# Patient Record
Sex: FEMALE | Race: BLACK OR AFRICAN AMERICAN | NOT HISPANIC OR LATINO | Employment: PART TIME | URBAN - METROPOLITAN AREA
[De-identification: names, ages, dates, MRNs, and addresses within clinical notes are randomized per-mention and may not be internally consistent; named-entity substitution may affect disease eponyms.]

---

## 2023-06-02 ENCOUNTER — TELEPHONE (OUTPATIENT)
Dept: TRANSPLANT | Facility: CLINIC | Age: 53
End: 2023-06-02

## 2023-06-07 ENCOUNTER — TELEPHONE (OUTPATIENT)
Dept: TRANSPLANT | Facility: CLINIC | Age: 53
End: 2023-06-07
Payer: COMMERCIAL

## 2023-06-09 ENCOUNTER — TELEPHONE (OUTPATIENT)
Dept: TRANSPLANT | Facility: CLINIC | Age: 53
End: 2023-06-09
Payer: COMMERCIAL

## 2023-06-14 ENCOUNTER — TELEPHONE (OUTPATIENT)
Dept: TRANSPLANT | Facility: CLINIC | Age: 53
End: 2023-06-14
Payer: COMMERCIAL

## 2023-06-20 ENCOUNTER — TELEPHONE (OUTPATIENT)
Dept: TRANSPLANT | Facility: CLINIC | Age: 53
End: 2023-06-20
Payer: COMMERCIAL

## 2023-06-20 DIAGNOSIS — Z76.82 ORGAN TRANSPLANT CANDIDATE: Primary | ICD-10-CM

## 2023-08-02 ENCOUNTER — TELEPHONE (OUTPATIENT)
Dept: TRANSPLANT | Facility: CLINIC | Age: 53
End: 2023-08-02
Payer: COMMERCIAL

## 2023-08-04 ENCOUNTER — TELEPHONE (OUTPATIENT)
Dept: TRANSPLANT | Facility: CLINIC | Age: 53
End: 2023-08-04
Payer: COMMERCIAL

## 2023-08-07 ENCOUNTER — TELEPHONE (OUTPATIENT)
Dept: TRANSPLANT | Facility: CLINIC | Age: 53
End: 2023-08-07
Payer: COMMERCIAL

## 2023-08-07 NOTE — TELEPHONE ENCOUNTER
MA notes per adherence form    FOR THE PAST THREE MONTHS:    0-AMA's  0-No-shows    No concerns with care giving, transportation, or mental health    Brought over to clinic to be scanned in.    Donna Campbell  Abdominal Transplant MA

## 2023-08-09 ENCOUNTER — HOSPITAL ENCOUNTER (OUTPATIENT)
Dept: RADIOLOGY | Facility: HOSPITAL | Age: 53
Discharge: HOME | End: 2023-08-09
Attending: NURSE PRACTITIONER
Payer: MEDICARE

## 2023-08-09 ENCOUNTER — OFFICE VISIT (OUTPATIENT)
Dept: TRANSPLANT | Facility: CLINIC | Age: 53
End: 2023-08-09
Payer: MEDICARE

## 2023-08-09 ENCOUNTER — TELEPHONE (OUTPATIENT)
Dept: TRANSPLANT | Facility: CLINIC | Age: 53
End: 2023-08-09
Payer: COMMERCIAL

## 2023-08-09 ENCOUNTER — DOCUMENTATION ONLY (OUTPATIENT)
Dept: TRANSPLANT | Facility: CLINIC | Age: 53
End: 2023-08-09
Payer: COMMERCIAL

## 2023-08-09 VITALS
BODY MASS INDEX: 38.16 KG/M2 | HEIGHT: 65 IN | SYSTOLIC BLOOD PRESSURE: 120 MMHG | OXYGEN SATURATION: 98 % | HEART RATE: 82 BPM | TEMPERATURE: 97 F | RESPIRATION RATE: 18 BRPM | WEIGHT: 229.06 LBS | DIASTOLIC BLOOD PRESSURE: 69 MMHG

## 2023-08-09 DIAGNOSIS — Z76.82 ORGAN TRANSPLANT CANDIDATE: ICD-10-CM

## 2023-08-09 DIAGNOSIS — G47.33 OSA ON CPAP: ICD-10-CM

## 2023-08-09 DIAGNOSIS — M10.9 GOUT, UNSPECIFIED CAUSE, UNSPECIFIED CHRONICITY, UNSPECIFIED SITE: ICD-10-CM

## 2023-08-09 DIAGNOSIS — I10 HYPERTENSION, UNSPECIFIED TYPE: ICD-10-CM

## 2023-08-09 DIAGNOSIS — Z99.2 ESRD ON DIALYSIS: ICD-10-CM

## 2023-08-09 DIAGNOSIS — N18.6 ESRD ON DIALYSIS: ICD-10-CM

## 2023-08-09 PROCEDURE — 76770 US RETROPERITONEAL COMPLETE: ICD-10-PCS | Mod: 26,TXP,, | Performed by: RADIOLOGY

## 2023-08-09 PROCEDURE — 99205 OFFICE O/P NEW HI 60 MIN: CPT | Mod: S$GLB,TXP,, | Performed by: INTERNAL MEDICINE

## 2023-08-09 PROCEDURE — 76770 US EXAM ABDO BACK WALL COMP: CPT | Mod: TC,TXP

## 2023-08-09 PROCEDURE — 99999 PR PBB SHADOW E&M-EST. PATIENT-LVL V: CPT | Mod: PBBFAC,TXP,, | Performed by: INTERNAL MEDICINE

## 2023-08-09 PROCEDURE — 76770 US EXAM ABDO BACK WALL COMP: CPT | Mod: 26,TXP,, | Performed by: RADIOLOGY

## 2023-08-09 PROCEDURE — 72170 X-RAY EXAM OF PELVIS: CPT | Mod: 26,TXP,, | Performed by: RADIOLOGY

## 2023-08-09 PROCEDURE — 99205 PR OFFICE/OUTPT VISIT, NEW, LEVL V, 60-74 MIN: ICD-10-PCS | Mod: S$GLB,TXP,, | Performed by: TRANSPLANT SURGERY

## 2023-08-09 PROCEDURE — 99999 PR PBB SHADOW E&M-EST. PATIENT-LVL V: ICD-10-PCS | Mod: PBBFAC,TXP,, | Performed by: INTERNAL MEDICINE

## 2023-08-09 PROCEDURE — 71046 XR CHEST PA AND LATERAL: ICD-10-PCS | Mod: 26,TXP,, | Performed by: RADIOLOGY

## 2023-08-09 PROCEDURE — 99214 PR OFFICE/OUTPT VISIT, EST, LEVL IV, 30-39 MIN: ICD-10-PCS | Mod: S$GLB,TXP,, | Performed by: REGISTERED NURSE

## 2023-08-09 PROCEDURE — 99205 OFFICE O/P NEW HI 60 MIN: CPT | Mod: S$GLB,TXP,, | Performed by: TRANSPLANT SURGERY

## 2023-08-09 PROCEDURE — 71046 X-RAY EXAM CHEST 2 VIEWS: CPT | Mod: 26,TXP,, | Performed by: RADIOLOGY

## 2023-08-09 PROCEDURE — 99215 OFFICE O/P EST HI 40 MIN: CPT | Mod: PBBFAC,25,TXP | Performed by: INTERNAL MEDICINE

## 2023-08-09 PROCEDURE — 99205 PR OFFICE/OUTPT VISIT, NEW, LEVL V, 60-74 MIN: ICD-10-PCS | Mod: S$GLB,TXP,, | Performed by: INTERNAL MEDICINE

## 2023-08-09 PROCEDURE — 99214 OFFICE O/P EST MOD 30 MIN: CPT | Mod: S$GLB,TXP,, | Performed by: REGISTERED NURSE

## 2023-08-09 PROCEDURE — 71046 X-RAY EXAM CHEST 2 VIEWS: CPT | Mod: TC,TXP

## 2023-08-09 PROCEDURE — 72170 X-RAY EXAM OF PELVIS: CPT | Mod: TC,TXP

## 2023-08-09 PROCEDURE — 72170 XR PELVIS ROUTINE AP: ICD-10-PCS | Mod: 26,TXP,, | Performed by: RADIOLOGY

## 2023-08-09 RX ORDER — POTASSIUM CHLORIDE 20 MEQ/1
20 TABLET, EXTENDED RELEASE ORAL DAILY
COMMUNITY

## 2023-08-09 RX ORDER — GABAPENTIN 100 MG/1
100 CAPSULE ORAL 2 TIMES DAILY
COMMUNITY

## 2023-08-09 RX ORDER — CARVEDILOL 25 MG/1
25 TABLET ORAL 2 TIMES DAILY
COMMUNITY

## 2023-08-09 RX ORDER — ESTRADIOL 0.5 MG/1
0.5 TABLET ORAL DAILY
COMMUNITY

## 2023-08-09 RX ORDER — METOLAZONE 5 MG/1
5 TABLET ORAL DAILY
COMMUNITY

## 2023-08-09 RX ORDER — CINACALCET 30 MG/1
30 TABLET, FILM COATED ORAL
COMMUNITY

## 2023-08-09 RX ORDER — FERRIC CITRATE 210 MG/1
420 TABLET, COATED ORAL 3 TIMES DAILY
COMMUNITY

## 2023-08-09 RX ORDER — FENOFIBRATE 160 MG/1
160 TABLET ORAL DAILY
COMMUNITY

## 2023-08-09 RX ORDER — ALLOPURINOL 300 MG/1
300 TABLET ORAL DAILY
COMMUNITY

## 2023-08-09 RX ORDER — FUROSEMIDE 80 MG/1
80 TABLET ORAL 2 TIMES DAILY
COMMUNITY

## 2023-08-09 RX ORDER — COLCHICINE 0.6 MG/1
0.6 TABLET ORAL DAILY PRN
COMMUNITY

## 2023-08-09 NOTE — PROGRESS NOTES
PRE-TRANSPLANT INFECTIOUS DISEASE CONSULT    Reason for Visit:  Pre-transplant evaluation  Referring Provider: Dr. Douglas Bhatt     History of Present Illness:    53 y.o. female with a history of ESRD on PD 2/2 covid induced renal failure presents for pre-kidney transplant evaluation. Denies infectious complications.     Infectious History:  Recent hospital admissions: No  Recent infections: No  Recent or current antibiotic use: No  History of recurrent infections *(sinus / pneumonia / UTI / SBP)*: No  History of diabetic foot wound or bone/joint infection: No  Recent dental infections, issues or procedures: No  History of chicken pox: Yes  History of shingles: No  History of STI: No  History of COVID infection: Yes in 2021 which required hospitalization.     History of Immunosuppression:  Prior chemotherapy / immunosuppression: No  Prior transplant: No  History of splenectomy: No    Tuberculosis:  Prior screening for latent TB: Yes  Prior diagnosis of latent TB: No  Risk factors for TB *known exposure, incarceration, homelessness*: Yes, was employed in [employer] system.     Geographical exposures:  Currently lives in [place], [place] with Alone  Lived in the following states: [place], [place]   Lived or travelled to the [facility] [place]: No  International travel: Yes but short term trips (<1 week) - [facility]   Travel-associated illness: No    Social/Environmental:  Occupation:     Pets: No   Livestock: No  Fishing / hunting: No  Hobbies: No   Water: [place] water  Consumption of raw/undercooked meat or seafood?  Yes. Consumes both.   Tobacco: No  Alcohol: No  Recreational drug use:  No  Sexual partners: NA      Past Histories:   Past Medical History:   Diagnosis Date    Anemia     Disorder of kidney and ureter     Gout, unspecified     Hypertension     OA (osteoarthritis)     VELIA on CPAP      Past Surgical History:   Procedure Laterality Date    PERITONEAL CATHETER INSERTION       History  reviewed. No pertinent family history.  Social History     Tobacco Use    Smoking status: Never    Smokeless tobacco: Never   Substance Use Topics    Alcohol use: Not Currently    Drug use: Never     Review of patient's allergies indicates:   Allergen Reactions    Aspirin Other (See Comments)         Immunization History:  Received all childhood vaccines: Yes  All household members receive annual flu vaccine: Yes  All household members are up to date on COVID vaccine: Yes      Current antibiotics:  Antibiotics (From admission, onward)      None              Review of Systems  Review of Systems   Constitutional: Negative for chills, fever, malaise/fatigue, night sweats, weight gain and weight loss.   Respiratory:  Negative for cough and hemoptysis.    Skin:  Negative for poor wound healing, rash and suspicious lesions.          Objective  Physical Exam  Vitals reviewed.   Constitutional:       General: She is not in acute distress.     Appearance: Normal appearance. She is normal weight. She is not ill-appearing.   HENT:      Head: Normocephalic.      Nose: Nose normal.      Mouth/Throat:      Mouth: Mucous membranes are moist.      Pharynx: Oropharynx is clear.   Eyes:      General: No scleral icterus.     Conjunctiva/sclera: Conjunctivae normal.   Cardiovascular:      Rate and Rhythm: Normal rate.   Pulmonary:      Effort: Pulmonary effort is normal. No respiratory distress.   Musculoskeletal:         General: Normal range of motion.   Skin:     General: Skin is warm and dry.      Findings: No lesion or rash.   Neurological:      Mental Status: She is alert and oriented to person, place, and time.   Psychiatric:         Mood and Affect: Mood normal.         Behavior: Behavior normal.           Labs:    CBC:   Lab Results   Component Value Date    WBC 4.98 08/09/2023    HGB 10.0 (L) 08/09/2023    HCT 30.3 (L) 08/09/2023     (H) 08/09/2023     08/09/2023    GRAN 3.4 08/09/2023    GRAN 67.5 08/09/2023     "LYMPH 1.1 08/09/2023    LYMPH 22.5 08/09/2023    MONO 0.4 08/09/2023    MONO 7.4 08/09/2023    EOSINOPHIL 2.0 08/09/2023       Syphilis screening: No results found for: "RPR", "PRPQ", "FTAABS"     TB screening: No results found for: "TBGOLDPLUS", "TSPOTSCREN"    HIV screening:   Lab Results   Component Value Date    UFC15THNK Non-reactive 08/09/2023       Strongyloides IgG: No results found for: "STRONGANTIGG"    Hepatitis Serologies:   Lab Results   Component Value Date    HEPAIGG Non-reactive 08/09/2023    HEPBCAB Non-reactive 08/09/2023    HEPBSAB 40.43 08/09/2023    HEPBSAB Reactive 08/09/2023    HEPCAB Non-reactive 08/09/2023        Varicella IgG: No results found for: "VARICELLAINT"        There is no immunization history on file for this patient.       Assessment and Plan    1. Risks of Infection: Available serologies were reviewed. No unusual risks of infection or significant barriers to transplantation were identified from the infectious disease standpoint given the information available at this time.    - Acute infectious issues: None   - Pending serologies: Quantiferon gold / T-spot, RPR, Strongyloides IgG, and VZV IgG   - Please call if any pending serologic testing is positive.    2. Immunizations:  Based on the patient's immunization history and serologies, the following immunizations are recommended:  - Hepatitis A    Patient does not have immunity to hepatitis A    Vaccination ordered today: Yes   - Hepatitis B    Patient does have immunity to hepatitis B    Vaccination ordered today: No. Reason for not ordering: Immunity   - COVID    Current CDC vaccination recommendations were discussed with the patient   - Annual high dose influenza     Vaccination ordered today: Yes   - Prevnar 20    Vaccination ordered today: No. Reason for not ordering: vaccination up to date   - Tdap    Vaccination ordered today: Yes   - Shingrix    Vaccination ordered today: Yes    Recommended Pre-Transplant Immunization " Schedule   Vaccine  0m 1m 2m 6m   Pneumococcal conjugate vaccine (Prevnar 20) X      Tetanus-diphtheria-pertussis (Tdap)* X      Hepatitis A Vaccine (Havrix)** X   X   Hepatitis B Vaccine (Heplisav)** X X     Influenza (annual) X      Zoster Recombinant Vaccine (Shingrix) X  X           *Administer booster every 10 years.       **Administer if no immunity demonstrated on serologies               Patient will receive vaccines at local pharmacy. A written prescription was provided for all vaccine doses.     3. Counseling:   I discussed with the patient the risk for increased susceptibility to infections following transplantation including increased risk for infection right after transplant and if rejection should occur.  The patient has been counseled on the importance of vaccinations to decrease risk of infection and severe illness. Specific guidance has been provided to the patient regarding the patient's occupation, hobbies and activities to avoid future infectious complications.     4. Transplant Candidacy: Based on available information, there are no identified significant barriers to transplantation from an infectious disease standpoint.  Final determination of transplant candidacy will be made once evaluation is complete and reviewed by the Selection Committee.      Follow up with infectious disease as needed.       The total time for evaluation and management services performed on 08/09/2023 was greater than 30 minutes.

## 2023-08-09 NOTE — PROGRESS NOTES
Transplant Surgery  Kidney Transplant Recipient Evaluation    Referring Physician: Douglas Bhatt  Current Nephrologist: Douglas Bhatt    Subjective:     Reason for Visit: evaluate transplant candidacy    History of Present Illness: Annia Abarca is a 53 y.o. year old female undergoing transplant evaluation.    Dialysis History: April is on peritoneal dialysis.      Transplant History: N/A    Etiology of Renal Disease:  (based on medical records from referral).    External provider notes reviewed: Yes    Review of Systems   Constitutional:  Negative for activity change, appetite change, chills, fatigue and fever.   HENT:  Negative for sore throat and trouble swallowing.    Eyes:  Negative for visual disturbance.   Respiratory:  Negative for cough, chest tightness and shortness of breath.    Cardiovascular:  Negative for chest pain, palpitations and leg swelling.   Gastrointestinal:  Negative for abdominal distention, abdominal pain, blood in stool, constipation, diarrhea, nausea and vomiting.   Endocrine: Negative for polyuria.   Genitourinary:  Negative for decreased urine volume, difficulty urinating, dysuria, flank pain, frequency and hematuria.   Musculoskeletal:  Negative for gait problem, myalgias and neck stiffness.   Skin:  Negative for rash and wound.   Neurological:  Negative for dizziness, tremors, seizures, weakness, light-headedness and headaches.   Hematological:  Negative for adenopathy.   Psychiatric/Behavioral:  Negative for agitation, confusion and sleep disturbance.      Objective:     Physical Exam:  Constitutional:   Vitals reviewed: yes   Well-nourished and well-groomed: yes  Eyes:   Sclerae icteric: no   Extraocular movements intact: yes  GI:    Bowel sounds normal: yes   Tenderness: no    If yes, quadrant/location: not applicable   Palpable masses: no    If yes, quadrant/location: not applicable   Hepatosplenomegaly: no   Ascites: no   Hernia: no    If yes, type/location: not  applicable   Surgical scars: yes    If yes, type/location: Pfannenstiel  PD catheter  Resp:   Effort normal: yes   Breath sounds normal: yes    CV:   Regular rate and rhythm: yes   Heart sounds normal: yes   Femoral pulses normal: yes   Extremities edematous: no  Skin:   Rashes or lesions present: no    If yes, describe:not applicable   Jaundice:: no    Musculoskeletal:   Gait normal: yes   Strength normal: yes  Psych:   Oriented to person, place, and time: yes   Affect and mood normal: yes    Additional comments: not applicable    Diagnostics:  The following labs have been reviewed: CBC  CMP  PT  INR  PTT  Pth  abo  The following radiology images have been independently reviewed and interpreted: Renal US    Counseling: We provided Annia Abarca with a group education session today.  We discussed kidney transplantation at length with her, including risks, potential complications, and alternatives in the management of her renal failure.  The discussion included complications related to anesthesia, bleeding, infection, primary nonfunction, and ATN.  I discussed the typical postoperative course, length of hospitalization, the need for long-term immunosuppression, and the need for long-term routine follow-up.  I discussed living-donor and -donor transplantation and the relative advantages and disadvantages of each.  I also discussed average waiting times for both living donation and  donation.  I discussed national and center-specific survival rates.  I also mentioned the potential benefit of multicenter listing to candidates listed with centers within more than one organ procurement organization.  All questions were answered.    Patient advised that it is recommended that all transplant candidates, and their close contacts and household members receive Covid vaccination.    Final determination of transplant candidacy will be made once evaluation is complete and reviewed by the Kidney & Kidney/Pancreas  Selection Committee.    Coronavirus disease (COVID-19) caused by severe acute respiratory virus coronavirus 2 (SARS-C0V 2) is associated with increased mortality in solid organ transplant recipients (SOT) compared to non-transplant patients. Vaccine responses to vaccination are depressed against SARS-CoV2 compared to normal individuals but improve with third vaccination doses. Vaccination prior to SOT provides both the best opportunity for transplant candidates to develop protective immunity and to reduce the risk of serious COVID19 infections post transplantation. Organ transplant candidates at [facility] will be required to receive SARS-CoV-2 vaccination prior to being listed with a an active status, whenever possible. Exceptions will be made for disability related reasons or for sincerely held [employer] beliefs.          Transplant Surgery - Candidacy   Assessment/Plan:   Annia Abarca has end stage renal disease (ESRD) on dialysis. I see no surgical contraindication to placing a kidney transplant. Based on available information, Annia Abarca is a suitable kidney transplant candidate.     Additional testing to be completed according to the Written Order Guidelines for Adult Pre-kidney and Pancreas Transplant Evaluation (KI-02).  Interpretation of tests and discussion of patient management involves all members of the multidisciplinary transplant team.    Lindsay Srivastava MD

## 2023-08-09 NOTE — LETTER
August 10, 2023        Douglas Bhatt  [address]  [address]  [place] [address]  Phone: [phone]  Fax: [phone]             [facility] Transplant 1st Fl  [address]  [facility] [address]  Phone: [phone]   Patient: Annia Abarca   MR Number: [id number]   YOB: 1970   Date of Visit: 8/9/2023       Dear Dr. Douglas Bhatt    Thank you for referring Annia Abarca to me for evaluation. Attached you will find relevant portions of my assessment and plan of care.    If you have questions, please do not hesitate to call me. I look forward to following Annia Abarca along with you.    Sincerely,    Sarah Alvarez MD    Enclosure    If you would like to receive this communication electronically, please contact [email or web address] or [phone] to request Ellie Link access.    Ellie Link is a tool which provides read-only access to select patient information with whom you have a relationship. Its easy to use and provides real time access to review your patients record including encounter summaries, notes, results, and demographic information.    If you feel you have received this communication in error or would no longer like to receive these types of communications, please e-mail [email or web address]

## 2023-08-09 NOTE — PROGRESS NOTES
"TRANSPLANT RECIPIENT EVALUATION    Referring Physician: Douglas Bhatt  Current Nephrologist: Douglas Bhatt    SUBJECTIVE       CC:   Initial evaluation of kidney transplant candidacy.    HPI:  Ms. Abarca is a 53 y.o. year old Black or  female who has presented to be evaluated as a potential kidney transplant recipient.  She has ESRD secondary to HTN (never had kidney biopsy)  She was diagnosed with advanced kidney disease  and proteinuria for years and ended up to dialysis after developing COVID infection which required hospitalization in 2021. The oldest GFR was found in care everywhere was 15 ml/min/1.73 m2 in 2018.  Patient is currently on peritoneal dialysis started in 2021. Patient reports tolerating dialysis well. . She has a PD catheter for dialysis access. No history of peritonitis.  Current urine output is about 2 cups. She states she has been listed in [facility].     Her PMH is significant for HTN; sleep apnea;  gout - on allopurinol and colchicine as needed); COVID infection, secondary hyperparathyroidism - PTH 1330 for which she is on sensipar for last few months and anemia.  Patient denies any history of coronary artery disease, stroke, peripheral vascular disease, seizure disorder, chronic obstructive pulmonary disease, deep venous thrombosis, pulmonary embolism, recurrent urinary tract infections or malignancies.    Light-touch Clinical Frailty: she has full time job- works at school  She does basic activities on daily basis without any symptoms of chest pain, SOB.  Ambulates without an assistive device.   No - Occasionally or most of the time last week "Everything was an effort"  No - Unintentional weight loss in the prior year  Yes - walking for exercise, moderately strenuous chores, any other physical activities  60 seconds- time to cross-arm stand 15 times  History of fall: No   Home O2: No   Hypotension: No     Active wound: no  Active Cancer: " no  History of hospitalization in last 12 months: no  Previous neurogenic bladder/Self-cath/recurrent UTI: no  Anticoagulation/ antiplatelet therapy and reason: no  History of DM: no  Previous Transplant: no  Potential Donor:         Past Medical History:  Past Medical History:   Diagnosis Date    Anemia     Disorder of kidney and ureter     Had CKD for a while and developed ESRD  on HD after developing COVID infection    Gout, unspecified     Gout, unspecified     Hypertension 2018    OA (osteoarthritis)     ankles    VELIA on CPAP        Past Surgical History:  Past Surgical History:   Procedure Laterality Date    BREAST SURGERY      Breast reduction     SECTION      PD catheter      PERITONEAL CATHETER INSERTION           Family History:  Family History   Problem Relation Age of Onset    Hypertension Mother     Diabetes Mother     Kidney disease Father     Cancer Daughter         Lymphoma in        Social History:  Social History     Socioeconomic History    Marital status: Single    Number of children: 1   Tobacco Use    Smoking status: Never    Smokeless tobacco: Never   Substance and Sexual Activity    Alcohol use: Not Currently    Drug use: Never    Sexual activity: Not Currently           Current Medication  Current Outpatient Medications   Medication Sig Dispense Refill    allopurinoL (ZYLOPRIM) 300 MG tablet Take 300 mg by mouth once daily.      carvediloL (COREG) 25 MG tablet Take 25 mg by mouth 2 (two) times daily.      cinacalcet (SENSIPAR) 30 MG Tab Take 30 mg by mouth daily with breakfast.      colchicine (COLCRYS) 0.6 mg tablet Take 0.6 mg by mouth daily as needed.      estradioL (ESTRACE) 0.5 MG tablet Take 0.5 mg by mouth once daily.      fenofibrate 160 MG Tab Take 160 mg by mouth once daily.      ferric citrate (AURYXIA) 210 mg iron Tab Take 420 mg by mouth 3 (three) times daily.      furosemide (LASIX) 80 MG tablet Take 80 mg by mouth 2 (two) times daily.       gabapentin (NEURONTIN) 100 MG capsule Take 100 mg by mouth 2 (two) times daily.      metOLazone (ZAROXOLYN) 5 MG tablet Take 5 mg by mouth once daily.      potassium chloride SA (K-DUR,KLOR-CON) 20 MEQ tablet Take 20 mEq by mouth once daily.      vit B complx C/folic acid/zinc (RENAPLEX ORAL) Take by mouth.       No current facility-administered medications for this visit.           Review of Systems    Constitutional: Negative for fever, +  fatigue.   HENT: Negative for hearing loss, sore throat and mouth sores.   Respiratory: Negative for cough, chest tightness, shortness of breath and wheezing.   Cardiovascular: Negative for chest pain, palpitations and leg swelling.   Gastrointestinal: Negative for nausea, vomiting, abdominal pain, diarrhea, constipation, blood in stool and abdominal distention.   Genitourinary: UOP ~ 2-3 cups  Musculoskeletal: Negative for back pain, + arthralgias   Skin: Negative for pallor, rash and wound.   Neurological: Negative for dizziness, tremors, syncope, weakness, light-headedness and headaches.   Hematological: Negative for adenopathy. Does not bruise/bleed easily.   Psychiatric/Behavioral: Negative for confusion, sleep disturbance and dysphoric mood. The patient is not nervous/anxious.       OBJECTIVE       Body mass index is 38.3 kg/m².    Vitals:    08/09/23 0712   BP: 120/69   Pulse: 82   Resp: 18   Temp: 97.2 °F (36.2 °C)       Physical Exam    General: No acute distress, well groomed  HEENT: Normocephalic, atraumatic,  external inspection of ears and nose normal, moist mucous membranes, no oral ulcerations/lesions   Neck: Supple, symmetrical, trachea midline, no masses  Respiratory: Clear to auscultation bilaterally, respirations unlabored  Cardiovacular: Regular rate and rhythm, S1, S2 normal, no murmurs  Gastrointestinal: Soft, non-tender, bowel sounds normal, no masses, PD catheter in place  Extremities: No clubbing or cyanosis of upper extremities bilaterally, no pedal  edema bilaterally  Lymph nodes: Cervical and supraclavicular nodes normal   Neurologic: No focal neurologic deficits. alert and oriented x 3  Musculoskeletal: moves all extremities without difficulty, FROM, 5/5 strength, ambulates without an assistive device  Psychiatric: Normal mood and affect. Responds appropriately to questions.        Labs: Reviewed with the patient    ASSESSMENT     1. ESRD on dialysis        PLAN     Transplant Candidacy:   After obtaining history and performing physical exam as well as reviewing available diagnostic studies, Ms. Abarca is a suitable kidney transplant candidate.  Meets center eligibility for accepting HCV+ donor offer - Yes.  Patient educated on HCV+ donors. April is willing to accept HCV+ donor offer - Yes   Patient is a candidate for KDPI > 85 kidney donor offer - No.     Final determination of transplant candidacy will be made once workup is complete and reviewed by the selection committee.    Prior to Listing, will need the following items to be completed:  1. Standard serologies, cardiac and imaging studies   2. Weight loss encouraged  3. PTH >1000. Pt was educated about the importance of taking sensipar daily and talking to her general nephrologist for dose adjustment   4. Cardiology consult

## 2023-08-09 NOTE — PROGRESS NOTES
INITIAL PATIENT EDUCATION NOTE    Ms. Annia Abarca was seen in pre-kidney transplant clinic for evaluation for kidney, kidney/pancreas or pancreas only transplant.  The patient attended a an individual video education session that discussed/reviewed the following aspects of transplantation: evaluation including diagnostic and laboratory testing,( Chemistries, Hematology, Serologies including HIV and Hepatitis and HLA) required for transplantation and selection committee process, UNOS waitlist management/multiple listings, types of organs offered (KDPI < 85%, KDPI > 85%, PHS risk, DCD, HCV+, HIV+ for HIV+ recipients and enbloc/dual), financial aspects, surgical procedures, dietary instruction pre- and post-transplant, health maintenance pre- and post-transplant, post-transplant hospitalization and outpatient follow-up, potential to participate in a research protocol, and medication management and side effects.  A question and answer session was provided after the presentation.    The patient was seen by all members of the multi-disciplinary team to include: Nephrologist/TERESA, Surgeon, , Transplant Coordinator, , Pharmacist and Dietician (if applicable).    The patient reviewed and signed all consents for evaluation which were witnessed and sent to scanning into the [facility] chart.    The patient was given an education book and plan for further evaluation based on her individual assessment.      Reviewed program requirement for complete COVID vaccination with documentation prior to listing.  COVID education information reviewed with patient. Patient encouraged to be up to date on all vaccinations.       The patient was informed that the transplant team would manage immediate post op pain. If the patient requires long term pain management, they will need to have that pain management addressed by their PCP or previous provider who wrote for long term pain medicines.    The patient was  encouraged to call with any questions or concerns.

## 2023-08-09 NOTE — PROGRESS NOTES
PHARM.D. PRE-TRANSPLANT NOTE:    This patient's medication therapy was evaluated as part of her pre-transplant evaluation.      The following general pharmacologic concerns were noted: patient on estradiol (HRT) - recommend to hold immediately post transplant due to increased risk of thrombosis    The following concerns for post-operative pain management were noted: none    The following pharmacologic concerns related to HCV therapy were noted: none      This patient's medication profile was reviewed for considerations for DAA Hepatitis C therapy:    [x]  No current inducers of CYP 3A4 or PGP  [x]  No amiodarone on this patient's EMR profile in the last 24 months  [x]  No past or current atrial fibrillation on this patient's EMR profile       Current Outpatient Medications   Medication Sig Dispense Refill    allopurinoL (ZYLOPRIM) 300 MG tablet Take 300 mg by mouth once daily.      carvediloL (COREG) 25 MG tablet Take 25 mg by mouth 2 (two) times daily.      cinacalcet (SENSIPAR) 30 MG Tab Take 30 mg by mouth daily with breakfast.      colchicine (COLCRYS) 0.6 mg tablet Take 0.6 mg by mouth daily as needed.      estradioL (ESTRACE) 0.5 MG tablet Take 0.5 mg by mouth once daily.      fenofibrate 160 MG Tab Take 160 mg by mouth once daily.      ferric citrate (AURYXIA) 210 mg iron Tab Take 420 mg by mouth 3 (three) times daily.      furosemide (LASIX) 80 MG tablet Take 80 mg by mouth 2 (two) times daily.      gabapentin (NEURONTIN) 100 MG capsule Take 100 mg by mouth 2 (two) times daily.      metOLazone (ZAROXOLYN) 5 MG tablet Take 5 mg by mouth once daily.      potassium chloride SA (K-DUR,KLOR-CON) 20 MEQ tablet Take 20 mEq by mouth once daily.      vit B complx C/folic acid/zinc (RENAPLEX ORAL) Take by mouth.       No current facility-administered medications for this visit.           I am available for consultation and can be contacted, as needed by the other members of the Transplant team.

## 2023-08-09 NOTE — LETTER
August 9, 2023      [facility] 1st Fl  [address]  [facility] [address]  Phone: [phone]       Patient: Annia Abarca   YOB: 1970  Date of Visit: 08/09/2023    To Whom It May Concern:    Tone Abarca  was at [facility] on 08/09/2023. The patient may return to work/school on 08/10/2023 with no restrictions. If you have any questions or concerns, or if I can be of further assistance, please do not hesitate to contact me.    Sincerely,    Sade Travis LPN

## 2023-08-09 NOTE — PROGRESS NOTES
Transplant Recipient Adult Psychosocial Assessment    April Abarca  [address]  [place] [address]  Telephone Information:   Mobile [phone]   Home  [phone] (home)  Work  There is no work phone number on file.  E-mail  No e-mail address on record    Sex: female  YOB: 1970  Age: 53 y.o.    Encounter Date: 2023  [place] Citizen: yes  Primary Language: English   Needed: no    Emergency Contact:  Roberth Oneal, 28 yo daughter, [place], does drive/own car, works full time as RN at [employer] ([place]). [phone]    Family/Social Support:   Number of dependents/: pt denies  Marital history: single, not   Other family dynamics: Pt reports father is . Pt reports supportive mother Oralia Garcias is retired, living well in [place] and will assist with transplant as needed. 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 Pt reports lives alone in [place]. Pt reports supportive daughter Roberth Oneal ([place]) and supportive sister Serina Howell ([place]) will assist with transplant. Pt's highly supportive daughter Roberth with patient for evaluation and reports will be patient's primary transplant caregiver.    Household Composition:  Pt reports lives alone in [place].    Do you and your caregivers have access to reliable transportation? yes  PRIMARY CAREGIVER: Roberth Oneal, daughter, will be primary caregiver, phone number [phone]     provided in-depth information to patient and caregiver regarding pre- and post-transplant caregiver role.   strongly encourages patient and caregiver to have concrete plan regarding post-transplant care giving, including back-up caregiver(s) to ensure care giving needs are met as needed.    Patient and Caregiver states understanding all aspects of caregiver role/commitment and is  able/willing/committed to being caregiver to the fullest extent necessary.    Patient and Caregiver verbalizes understanding of the education provided today and caregiver responsibilities.         remains available. Patient and Caregiver agree to contact  in a timely manner if concerns arise.      Able to take time off work without financial concerns: yes.     Additional Significant Others who will Assist with Transplant:  Oralia Newton, 75 yo mother, [place], does drive/own car, retired. [phone]  Serina Howell, 45 yo sister, [place], does drive/own car, works full time .       Living Will: no  Healthcare Power of : no  Advance Directives on file: <<no information> per medical record.  Verbally reviewed LW/HCPA information.   provided patient with copy of LW/HCPA documents and provided education on completion of forms.    Living Donors: Education and resource information given to patient.    Highest Education Level: Attended College/Technical School completed high school and completed beauty school  Reading Ability: 12th grade  Reports difficulty with: seeing, wears glasses. Pt denies any problems learning new information.  Learns Best By:  multisensory     Status: no  VA Benefits: no     Working for Income: yes  If yes, working activity level: Working Full Time  Patient reports working full time at [employer]. 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    Spouse/Significant Other Employment: pt reports is not     Disabled: pt denies    Annual Income:  $30,000 per year  Able to afford all costs now and if transplanted, including medications: yes  Patient and Caregiver verbalizes understanding of personal responsibilities related to transplant costs and the importance of having a financial plan  to ensure that patients transplant costs are fully covered.       provided fundraising information/education. Patient and Caregiververbalizes understanding.   remains available.    Insurance:   Payer/Plan Subscr  Sex Relation Sub. Ins. ID Effective Group Num   1. BLUE CROSS BL* TARA,1970 Female Self [id number]* [id number]                                   [address] [address]   2. MEDICARE - ME* TARA,1970 Female Self [id number]                                    [address]     Primary Insurance (for UNOS reporting): Private Insurance, utilizes [facility] for medicines.  Pt reports plan to contact BCBS about any transplant benefits available such as transplant lodging, meals and travel costs.  Secondary Insurance (for UNOS reporting): Public Insurance - Medicare FFS (Fee For Service)  Patient and Caregiver verbalizes clear understanding that patient may experience difficulty obtaining and/or be denied insurance coverage post-surgery. This includes and is not limited to disability insurance, life insurance, health insurance, burial insurance, long term care insurance, and other insurances.      Patient and Caregiver also reports understanding that future health concerns related to or unrelated to transplantation may not be covered by patient's insurance.  Resources and information provided and reviewed.     Patient and Caregiver provides verbal permission to release any necessary information to outside resources for patient care and discharge planning.  Resources and information provided are reviewed.      [facility], [phone]. PD    Dialysis Adherence: Patient and Caregiver reports pt having high compliance with dialysis treatments and instructions within last 3 months.  23 Dialysis compliance update shows suitable dialysis compliance.    Infusion Service: patient utilizing? no  Home Health: patient utilizing? no  DME: yes PD equipment and  "supplies; CPAP  Pulmonary/Cardiac Rehab: pt denies   ADLS:  independent with all ADLs, including self care, medication management and does drive self/own car    Adherence:   Pt reports high medical compliance with medical appointments and instructions within last 3 months.  Adherence education and counseling provided.     Per History Section:  Past Medical History:   Diagnosis Date    Anemia     Disorder of kidney and ureter     Gout, unspecified     Hypertension     OA (osteoarthritis)     VELIA on CPAP      Social History     Tobacco Use    Smoking status: Never    Smokeless tobacco: Never   Substance Use Topics    Alcohol use: Not Currently     Social History     Substance and Sexual Activity   Drug Use Never     Social History     Substance and Sexual Activity   Sexual Activity Not Currently       Per Today's Psychosocial:  Tobacco: none, patient denies any use.  Alcohol: none at this time. Pt reports previous alcohol use was "occasional" "special occasions"  Illicit Drugs/Non-prescribed Medications: none, patient denies any use.    Patient and Caregiver states clear understanding of the potential impact of substance use as it relates to transplant candidacy and is aware of possible random substance screening.  Substance abstinence/cessation counseling, education and resources provided and reviewed.     Arrests/DWI/Treatment/Rehab: patient denies    Psychiatric History:    Mental Health: Pt denies mental health history and denies any mental health concerns. Pt denies any need for mental health referral at this time.  Psychiatrist/Counselor: pt denies  Medications:  pt denies  Suicide/Homicide Issues: pt denies any history of si/hi  Safety at home: pt reports living in safe home environment with no abuse.    Knowledge: Patient and Caregiver states having clear understanding and realistic expectations regarding the potential risks and potential benefits of organ transplantation and organ donation and agrees to " discuss with health care team members and support system members, as well as to utilize available resources and express questions and/or concerns in order to further facilitate the pt informed decision-making.  Resources and information provided and reviewed.    Patient and Caregiver is aware of [facility] affiliation and/or partnership with agencies in home health care, LTAC, SNF, [facility], and other hospitals and clinics.    Understanding: Patient and Caregiver reports having a clear understanding of the many lifetime commitments involved with being a transplant recipient, including costs, compliance, medications, lab work, procedures, appointments, concrete and financial planning, preparedness, timely and appropriate communication of concerns, abstinence (ETOH, tobacco, illicit non-prescribed drugs), adherence to all health care team recommendations, support system and caregiver involvement, appropriate and timely resource utilization and follow-through, mental health counseling as needed/recommended, and patient and caregiver responsibilities.  Social Service Handbook, resources and detailed educational information provided and reviewed.  Educational information provided.    Patient and Caregiver also reports current and expected compliance with health care regime and states having a clear understanding of the importance of compliance.      Patient and Caregiver reports a clear understanding that risks and benefits may be involved with organ transplantation and with organ donation.       Patient and Caregiver also reports clear understanding that psychosocial risk factors may affect patient, and include but are not limited to feelings of depression, generalized anxiety, anxiety regarding dependence on others, post traumatic stress disorder, feelings of guilt and other emotional and/or mental concerns, and/or exacerbation of existing mental health concerns.  Detailed resources provided and discussed.      Patient and  Caregiver agrees to access appropriate resources in a timely manner as needed and/or as recommended, and to communicate concerns appropriately.  Patient and Caregiver also reports a clear understanding of treatment options available.     Patient and Caregiver received education in a group setting.   reviewed education, provided additional information, and answered questions.    Feelings or Concerns: Pt reports high motivation to pursue kidney organ transplant at this time. Pt reports is already listed at [facility] for kidney transplant.    Coping: Identify Patient & Caregiver Strategies to [place]:   1. In the past, coping with major surgery and/or related stress - working 2 jobs; family support; mansoor and prayer; watches tv for fun, does not exercise; enjoys eating out with family/friends.   2. Currently & Pre-transplant - working 2 jobs; family support; mansoor and prayer; watches tv for fun, does not exercise; enjoys eating out with family/friends.   3. At the time of surgery -  family support; mansoor and prayer; watches tv for fun.   4. During post-Transplant & Recovery Period -  family support; mansoor and prayer; watches tv for fun.    Goals: Pt reports hope for successful kidney organ transplant so she can discontinue dialysis. Pt reports plan to return to work once transplanted and recovered. Patient referred to Vocational Rehabilitation.    Interview Behavior: Patient and Caregiver presents as alert and oriented x 4, pleasant, good eye contact, well groomed, recall good, concentration/judgement good, average intelligence, calm, communicative, cooperative, and asking and answering questions appropriately. Pt's highly supportive daughter Roberth Oneal in session with patient's permission.         Transplant Social Work - Candidacy  Assessment/Plan:     Psychosocial Suitability: Patient presents as low risk candidate for kidney transplant at this time. Based on psychosocial risk factors, patient presents as  low risk due to patient denying psychosocial barriers to kidney organ transplant at this time. Pt reports having organ transplant caregiver/transportation plan, medical insurance plan and plan to afford transplant costs all in place.    Recommendations/Additional Comments: 8-7-23 Dialysis compliance update is suitable. Pt reports plan to ask BCBS of any transplant benefits for travel, meals and lodging. 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    SW recommends that pt conduct fundraising to assist pt with pay for cost of medications, food, gas, and other transplant related needs.  SW recommends that pt remain aware of potential mental health concerns and contact the team if any concerns arise.  SW recommends that pt remain abstinent from tobacco, ETOH, and drug use.  SW supports pt's continued adherence. SW remains available to answer any questions or concerns that arise as the pt moves through the transplant process.      Final determination of transplant candidacy will be reviewed by the selection committee.      Nina ROBLES LCSW

## 2023-08-09 NOTE — TELEPHONE ENCOUNTER
Reviewed pt transplant labs.  Notified dialysis unit dietitian of the following abnormal labs via fax and requested their most recent nutrition note on this pt.  Once this note is received it will be scanned into pt's chart.     Phos 6.6

## 2023-08-16 ENCOUNTER — TELEPHONE (OUTPATIENT)
Dept: TRANSPLANT | Facility: CLINIC | Age: 53
End: 2023-08-16
Payer: COMMERCIAL

## 2023-08-16 ENCOUNTER — DOCUMENTATION ONLY (OUTPATIENT)
Dept: TRANSPLANT | Facility: CLINIC | Age: 53
End: 2023-08-16
Payer: COMMERCIAL

## 2023-08-16 DIAGNOSIS — Z76.82 ORGAN TRANSPLANT CANDIDATE: Primary | ICD-10-CM

## 2023-08-18 ENCOUNTER — TELEPHONE (OUTPATIENT)
Dept: TRANSPLANT | Facility: CLINIC | Age: 53
End: 2023-08-18
Payer: COMMERCIAL

## 2023-08-21 ENCOUNTER — TELEPHONE (OUTPATIENT)
Dept: TRANSPLANT | Facility: CLINIC | Age: 53
End: 2023-08-21
Payer: COMMERCIAL

## 2023-09-13 ENCOUNTER — TELEPHONE (OUTPATIENT)
Dept: TRANSPLANT | Facility: CLINIC | Age: 53
End: 2023-09-13
Payer: COMMERCIAL

## 2023-09-13 NOTE — TELEPHONE ENCOUNTER
"----- Message from Judd Rodriguez sent at 9/12/2023  9:58 AM CDT -----  Consult/Advisory:        Name Of Caller: Self      Contact Preference?: [phone]       What is the nature of the call?: Calling to speak w/ Dasha about her current status        Additional Notes:  "Thank you for all that you do for our patients"      "

## 2023-09-13 NOTE — PROGRESS NOTES
Returned call to pt, she reports that all work-up appointments have been scheduled, the last appt second week of October, if no further testing or appointments are needed and ready for committee will plan yo present at the  10/20/23 meeting.

## 2024-03-14 ENCOUNTER — EPISODE CHANGES (OUTPATIENT)
Dept: TRANSPLANT | Facility: CLINIC | Age: 54
End: 2024-03-14

## 2024-03-14 ENCOUNTER — TELEPHONE (OUTPATIENT)
Dept: TRANSPLANT | Facility: CLINIC | Age: 54
End: 2024-03-14
Payer: COMMERCIAL

## 2024-03-14 NOTE — TELEPHONE ENCOUNTER
Spoke w/pt regarding required test completion. Pt stated she will call me back this evening with the name of the facility where test were completed. Stated she has completed them a while ago and was under the impression her  faxed the records over to us. Pt stated she will contact her SW as well and have them send it.

## 2024-03-20 ENCOUNTER — TELEPHONE (OUTPATIENT)
Dept: TRANSPLANT | Facility: CLINIC | Age: 54
End: 2024-03-20
Payer: COMMERCIAL

## 2024-03-20 NOTE — TELEPHONE ENCOUNTER
Sp[facility] with [facility]ia from [facility] regarding completion of test. Stated she can get the mammo and colon faxed to us and the stress, echo and cards were done at [facility].

## 2024-04-01 ENCOUNTER — TELEPHONE (OUTPATIENT)
Dept: TRANSPLANT | Facility: CLINIC | Age: 54
End: 2024-04-01
Payer: COMMERCIAL

## 2024-04-01 NOTE — TELEPHONE ENCOUNTER
Attempted to contact ALYSA Ang at [facility] regarding cardiology testing. Left detailed message and call back number along with fax number to send records.

## 2024-04-01 NOTE — TELEPHONE ENCOUNTER
----- Message from Adela Singh sent at 4/1/2024  3:05 PM CDT -----  Regarding: Testing Questions      Name Of Caller:    Ashia Jarvsiood MS      Contact Preference:    [phone]      Nature of call:   Ms. Ang would like to verify if the office received the pt's Stress test, Echo and Cardiology results.

## 2024-05-23 ENCOUNTER — TELEPHONE (OUTPATIENT)
Dept: TRANSPLANT | Facility: CLINIC | Age: 54
End: 2024-05-23
Payer: COMMERCIAL

## 2024-05-23 NOTE — TELEPHONE ENCOUNTER
----- Message from Jordi Gonzalez MA sent at 5/22/2024 11:05 AM CDT -----  Regarding: FW: Patient advice  Contact: Pt  [phone]    ----- Message -----  From: Jud Park  Sent: 5/22/2024  10:16 AM CDT  To: [facility] Pre-Kidney Transplant Non-Clinical  Subject: Patient advice                                               Name of Caller: April      Contact Preference:    [phone]    Nature of Call:  Follow up call would like to verify if the office received the Stress test, Echo and Cardiology results. Please call with status

## 2024-05-23 NOTE — TELEPHONE ENCOUNTER
Spoke to pt regarding needed cardiac testing. Fax number provided to pt via email per pt request. Pt also notified PAP/GYN visit is still missing.

## 2024-05-27 ENCOUNTER — TELEPHONE (OUTPATIENT)
Dept: TRANSPLANT | Facility: CLINIC | Age: 54
End: 2024-05-27
Payer: COMMERCIAL

## 2024-05-27 NOTE — TELEPHONE ENCOUNTER
Returned call, left vm message, waiting on stress test report, gyn/pap report and colonoscopy report to complete work-up.   ----- Message from Alvina Lowery RN sent at 3/5/2024  6:52 PM CST -----  Regarding: FW: what is needed  Contact: Ashia Owen     ----- Message -----  From: Jaylin Alberts  Sent: 3/5/2024  12:08 PM CST  To: [facility] Pre-Kidney Transplant Clinical  Subject: what is needed                                   Ashia Rosa - needing to know what is still needed by patient for her evaluation  Please reach out at your convenience       Ashia Owen

## 2024-06-03 ENCOUNTER — TELEPHONE (OUTPATIENT)
Dept: TRANSPLANT | Facility: CLINIC | Age: 54
End: 2024-06-03
Payer: COMMERCIAL

## 2024-06-03 NOTE — LETTER
Lisseth 3, 2024    Annia Abarca   [address]  [place] [address]       Dear Hanny Blanco, Primary Doctor    Patient: Annia Abarca   MR Number: [id number]   YOB: 1970     A battery of tests must be done to determine if you are in suitable health to undergo a kidney transplant.  All  the recommended studies must be completed and received by the transplant team before you can be presented to the transplant selection committee. Once all your evaluation is complete the committee will then decide if you are a suitable transplant candidate.  The following studies need to be obtained at home:      _X__Gynecologic exam: The need for a pap smear will be determined by gynecologist.    _X__Cardiac stress test: ICD-10 code N19,  Z01.810  We request you to have a stress test to determine if you have any evidence of blockages in your heart.  We usually recommend a nuclear stress test or dobutamine stress echo, given most patients cannot walk on a treadmill long enough to achieve their target heart rate.     _X__Cardiology consult: ICD-10 code : Z01.801 We are asking that your cardiologist clear you for transplant surgery and maximize your medical management.  We also need to note if there are special  management strategies that need to be used during your transplant event, especially since we routinely use IV fluids to help the new kidney function at its best.  Also, your heart doctor needs to know that the average wait for a kidney transplant can be as long as 3-5 years.  Thus, we not only ask for a preoperative clearance, but also optimal management of your heart  (for example: lipids, high blood pressure, heart failure, etc.).    You and your doctor should feel free to contact us at any time, if there are questions or concerns about these tests or the transplant evaluation process.    Sincerely,    Chely Dhaliwal MD  Medical Director, Kidney & Kidney/Pancreas Transplantation      [facility] [place]  [facility] [place] [address]  [phone]

## 2024-06-03 NOTE — TELEPHONE ENCOUNTER
Spoke to Ashia at [facility] dialysis clinic, confirmed that Stress test and GYN/Pap will complete pt's work-up, sent updated order sheet to her [phone] and she confirmed that she will assist pt in getting these completed.

## 2024-06-13 ENCOUNTER — TELEPHONE (OUTPATIENT)
Dept: TRANSPLANT | Facility: CLINIC | Age: 54
End: 2024-06-13
Payer: COMMERCIAL

## 2024-06-13 NOTE — TELEPHONE ENCOUNTER
Transplant Nephrology Review of Pre-transplant work up    54 yr old AAF with ESRD 2 to presumed HTN on PD since 2021. Patient seen in [facility] On 8/2023   - followed for CKD for many years but started HD after COVID infection   - HTN   - VELIA   - gout   - secondary hyperparathyroidism with PTH of 1330     BMI 38.3    Work up pending:   - needs TTE and stress test   - needs colonoscopy   - needs pap and mammogram    Plan: send patient a 30 day letter and if no response, can close evaluation     La Nena Sherwood DO   Transplant Nephrology

## 2024-06-26 ENCOUNTER — TELEPHONE (OUTPATIENT)
Dept: TRANSPLANT | Facility: CLINIC | Age: 54
End: 2024-06-26
Payer: COMMERCIAL

## 2024-06-26 NOTE — TELEPHONE ENCOUNTER
Spoke to pt regarding stress and gyn. Pt stated she is going to [facility] in the morning to get records and andres gyn. Pt will call back tomorrow with an update.

## 2024-06-26 NOTE — TELEPHONE ENCOUNTER
----- Message from Dasha Lopez RN sent at 6/21/2024  8:21 PM CDT -----  Regarding: stress & gyn  Holly Tinsley told me to ask you to call her or her dialysis clinic tofind out if she did stress & gyn and get records if she did-   Thank you so much!!   I will be back Thursday  Dasha

## 2024-07-02 ENCOUNTER — TELEPHONE (OUTPATIENT)
Dept: TRANSPLANT | Facility: CLINIC | Age: 54
End: 2024-07-02
Payer: COMMERCIAL

## 2024-07-02 NOTE — TELEPHONE ENCOUNTER
Spoke to pt regarding stress and gyn. Pt stated she had her stress completed at [facility] with Dr. Reed Person [phone] (Nurse Soco ext. [phone]). Pt stated she had a total hysterectomy and her Dr. Johnson [phone] (Nurse Negar) at [facility] isn't sure what she needs for clearance. Records req'd.

## 2024-07-16 ENCOUNTER — TELEPHONE (OUTPATIENT)
Dept: TRANSPLANT | Facility: CLINIC | Age: 54
End: 2024-07-16
Payer: MEDICARE

## 2024-07-17 ENCOUNTER — TELEPHONE (OUTPATIENT)
Dept: TRANSPLANT | Facility: CLINIC | Age: 54
End: 2024-07-17
Payer: MEDICARE

## 2024-07-17 NOTE — TELEPHONE ENCOUNTER
MA notes per Adherence form     HH PT    FOR THE PAST THREE MONTHS:    0-AMA's  0-No-shows    No concerns with care giving, transportation, or mental health    Scanned in pt's media    Donna Campbell  Abdominal Transplant MA

## 2024-07-19 ENCOUNTER — COMMITTEE REVIEW (OUTPATIENT)
Dept: TRANSPLANT | Facility: CLINIC | Age: 54
End: 2024-07-19
Payer: MEDICARE

## 2024-07-19 NOTE — COMMITTEE REVIEW
Native Organ Dx:       SELECTION COMMITTEE NOTE    Annia Abarca was presented at selection committee on 7/19/2024 .  Patient met selection criteria for kidney transplant related to ESRD due to  .  No absolute contraindications to transplant at this time.  Patient will be placed on the cadaveric wait list pending mammogram and final financial approval from insurance company.  Patient will return to clinic for routine appointment in 1 year(s). Patient does not meet criteria for High KDPI kidney offer. Patient does not meet HCV+ donor offer. No by choice. Patient does not meet criteria for dual/enbloc Planned immunosuppression Thymo.    Notified pt via phone, she verbalized understnadin     Note written by     ===============================================    I was present at the meeting and attest to the decision of the committee.    Paul Alas  07/22/2024

## 2024-07-22 ENCOUNTER — TELEPHONE (OUTPATIENT)
Dept: TRANSPLANT | Facility: CLINIC | Age: 54
End: 2024-07-22
Payer: MEDICARE

## 2024-08-12 ENCOUNTER — EPISODE CHANGES (OUTPATIENT)
Dept: TRANSPLANT | Facility: CLINIC | Age: 54
End: 2024-08-12

## 2024-08-16 ENCOUNTER — TELEPHONE (OUTPATIENT)
Dept: TRANSPLANT | Facility: CLINIC | Age: 54
End: 2024-08-16

## 2024-10-18 ENCOUNTER — TELEPHONE (OUTPATIENT)
Dept: TRANSPLANT | Facility: CLINIC | Age: 54
End: 2024-10-18
Payer: MEDICARE

## 2024-10-18 NOTE — TELEPHONE ENCOUNTER
Chart was transferred to me and upon reviewing it I noticed the ckecklist was missing stress test. Uploaded now.  Patient was discussed with Dr. Alvarez for pending MMG results. Normal MMG report. Checklist updated and it is ok to list patient now.

## 2024-10-24 ENCOUNTER — TELEPHONE (OUTPATIENT)
Dept: TRANSPLANT | Facility: CLINIC | Age: 54
End: 2024-10-24
Payer: MEDICARE

## 2024-10-24 DIAGNOSIS — Z76.82 PRE-KIDNEY TRANSPLANT, LISTED: Primary | ICD-10-CM

## 2024-10-24 DIAGNOSIS — N18.6 END STAGE RENAL DISEASE: Primary | ICD-10-CM

## 2024-10-24 DIAGNOSIS — Z76.82 ORGAN TRANSPLANT CANDIDATE: Primary | ICD-10-CM

## 2024-10-24 DIAGNOSIS — Z76.82 ORGAN TRANSPLANT CANDIDATE: ICD-10-CM

## 2024-10-24 NOTE — LETTER
2024    Annia Abarca  [address]  [place] [address]    Dear Annia Abarca:  MRN: [id number]    Congratulations! On 10/24/2024, you were placed on  the waiting list for a  donor transplant.    Your candidacy for kidney transplant is based on the following criteria: ESRD.    Your transplant coordinator while on the waiting list is Tiesha Enriquez RN. They can be reached at [phone] or [phone] with any questions.      What to do now?    Ask your living donors to begin testing   Share our screening website with anyone interested: [email or web address]  Make sure donors have your name and date of birth  You will get transplanted much faster if you have a living donor    Have your blood sent to our Transplant Lab every month  If you are on dialysis - our Transplant Lab will work with your dialysis unit to send your blood every month  If you are not on dialysis   If you live near an [facility] lab, we will schedule you to have blood drawn every month  If you do not live near an [facility] lab, you will be sent blood kits in the mail. You will need to take a kit to your local lab or doctor to have your blood drawn every month and mail to the Transplant Lab.     Call us with ANY CHANGES  Phone numbers - we must be able to reach you anytime of the day or night when a kidney is available  Address  Insurance coverage  Dialysis unit or kidney doctor  [place]: if you have surgery, stay in the hospital, have to get blood, or have an infection    Review your Kidney/Kidney-Pancreas Transplant Guide   This will give you detailed information about what happens when  you are on the waiting list   you are called when a kidney is available    The [facility] has a transplant surgeon and physician available 365 days a year, 24 hours a day to coordinate organ acceptance, procurement, surgical placement and to address urgent patient care issues.  You will be notified in writing of any  changes to our Transplant Centers staffing plan that would impact your ability to receive a transplant.    Attached is a letter from the United Network for Organ Sharing (UNOS). It describes the services and information offered to patients by UNOS and the Organ Procurement and Transplant Network. We look forward to working with you while on the waiting list.     We would like to inform you of an important OPTN (Organ Procurement and Transplant Network) Policy change that may affect the waiting time for some candidates on our waiting list.     Waiting time is important in identifying who receives offers for kidneys. A long wait time may increase your chance of getting an offer. Wait time is based on a test called eGFR that tells how well your kidneys are working. Wait time could also be based on how long you have been on dialysis. Government and health officials have changed the way this test is used. Before this year, hospitals used an eGFR that would include your race. For Black or  Americans, this eGFR could have shown that their kidneys were working better than they were.    Because of this change, we are looking at everyones record and assessing waiting time for people who are eligible. We will be reviewing everyones medical records and will contact you if you are eligible.     Who can I talk to if I have a question?  You can contact us if you have questions or send a message through [facility].     Please give us time to answer your questions. We are working on this for many patients.    How can I learn more about eGFR and this policy change?  Go to OPTN website > Patients > Kidney > FAQ: Understanding race-neutral eGFR calculations  Full URL: https://optn.transplant.hrsa.gov/patients/by-organ/kidney/understanding-the-proposal-to-require-race-neutral-egfr-calculations/  Call the Organ Procurement and Transplantation Network (OPTN) toll-free patient services line at  2-563-706-0041    Congratulations,    Your Transplant Partner  [facility]   [facility] [place] [address]  [phone]  lh/enclosure    CC:  Douglas Bhatt MD           [facility]                                                   The Organ Procurement and Transplantation Network   Toll-free patient services line: 5-422-491-3784  Your resource for organ transplant information      Staffed 8:30 am - 5:00 pm ET Monday - Friday   Leave a message 24/7 to receive a call back    The Organ Procurement and Transplantation Network (OPTN) is the national transplant system. It makes the policies that decide how donated organs are matched to patients waiting for a transplant. The OPTN:    Makes sure donated organs get matched to people on the transplant waiting list  Tells people about the donation and transplant processes  Makes sure that the public knows about the need for more organ and tissue donations    The OPTN has a free patient services line that you can call to:  Get more information about:   o Organ donation and organ transplants   o Donation and transplant policies  Get an information kit with:   o A list of transplant hospitals   o Waiting list information  Talk about any questions you may have about your transplant hospital or organ procurement organization. The staff will do their best to help you or point you to others who may help.  Find out how you can volunteer with the OPTN and help shape transplant policy    The patient services line number is: 4-526-183-3284    Patient services line staff CANNOT answer questions about your own medical care, including:  Waiting list status  Test results  Medical records  You will need to call your transplant hospital for this information.    The following websites have more information about transplantation and donation:  OPTN: https://optn.transplant.[facility].gov/  For potential living donors and transplant recipients:   o  Living with transplant: https://www.transplantliving.org/   o Living donation process: https://optn.transplant.hrsa.gov/living-donation/     o Financial assistance: https://www.livingdonorassistance.org/  Transplantation data: https://www.srtr.org/  Organ donation: https://www.organdonor.gov/    Volunteer with the OPTN: https://optn.transplant.hrsa.gov/get-involved

## 2024-10-24 NOTE — TELEPHONE ENCOUNTER
I have attempted without success to contact this patient by phone to schedule annual wait list appts. Message was left to return call.

## 2024-10-24 NOTE — TELEPHONE ENCOUNTER
"Spoke to pt confirming scheduled annual wait list appts on 12/27/2024. Appt reminders were mailed and pt is aware to bring care giver.    ----- Message from James Davis sent at 10/24/2024  3:43 PM CDT -----    ----- Message -----  From: Judd Rodriguez  Sent: 10/24/2024   3:42 PM CDT  To: [facility] Pre-Kidney Transplant Non-Clinical    Consult/Advisory    Name Of Caller: Self    Contact Preference?: [phone]     What is the nature of the call?: Returning call to Natalie    Additional Notes:  "Thank you for all that you do for our patients"  "

## 2024-10-24 NOTE — TELEPHONE ENCOUNTER
"KIDNEY WAIT LISTING NOTE    Date of Financial clearance to list: 2024    N/[facility]:     Organ: Kidney    Last Name: Tevin  First Name: April    : 1970       Gender: female        MRN#: [id number]                                   State of Permanent Residence: [place] [address]  [place] [address]    Ethnicity: Not  or /a   Race:      Black or     CLINICAL INFORMATION   Candidate Medical Urgency Status: Active (1)  Number of Previous Kidney Transplants: 0  Number of Previous Solid Organ Transplants: 0  Did you enter number of previous kidney or other solid organ transplants? Yes  Is this Candidate a Prior Living Donor: No  (If yes, please generate letter to UNOS with patient's date of donation, recipient SSN, signed by Surgical Director after patient is listed in order to receive priority points).      ABO  ABO Blood Group:   O     ABO Confirmation: (THESE DATES MUST BE PRIOR TO THE LIST DATE AND SUPPORTED BY SEPARATE LAB REPORTS)    Internal Results    Lab Results   Component Value Date    GROUPTRH O POS 2023     Lab Results   Component Value Date    ABO O 2023    ABO O 2023       External Results    ABO Date 1:    ABO Date 2  Are either of these ABO results based on External Labs? No  (If Yes, STOP and go to source document in Media Tab for verification).    VITALS  Height:  5' 4.84" (1.647 m   Weight:  103.9 kg (229 lb 0.9 oz)    (Use height from Transplant clinic visits only).  Did you enter height/weight? Yes    HLA    Class I:  Lab Results   Component Value Date    ZWDC6HO 3 2023    TGPK8JL 29 2023    DYEP0QY 7 2023    VBCX2PX 58 2023    KBZAC3UF 6 2023    VNGTM3RS 4 2023    WKZOD7JS 6 2023    QFWKJ5AS 7 2023       Class II:  Lab Results   Component Value Date    LSGTFI07VI 12 2023    TXAGTO70LT 15 2023    DJTBXP602FR 52 2023    BCADEE7061 51 2023    BTTOK2RT 5 2023    " "HFVLH3IS 6 08/09/2023       Tested for HLA Antibodies: Yes, antibodies detected     If result is "Positive" antibodies are detected     If result is "Negative or questionable" no antibodies detected    No results found for: "CIPRAS", "CIIPRAS"    DIALYSIS INFORMATION  Is patient Pre-Dialysis: No     GFR Information  Report GFR being used as the criteria for placement on the kidney list. If not, leave blank  GFR < or = 20 ml/min? n/a  If Yes, Specify value  ___   ml/min     Initial date GFR became 20 or less:   Is GFR obtained from an Outside lab Result? n/a  (If YES verify with source document scanned into media)    If patient on Dialysis:    Is candidate currently on dialysis for ESRD? Yes  If Yes,  Date Chronic Dialysis Started:   4/8/2021  (verify with source document in Media Tab)   Dialysis Unit Name: [facility]  [address]  [address] [place] [address] [place] [id number]                        Physician Name:  Dr. Chely Santana  NPI#: [id number]    DIABETES INFORMATION  Primary Native Kidney Diagnosis:   C-Peptide Value - No results found for: "CPEPTIDE"  Current Diabetes Status: Does not have diabetes    FOR NON-KIDNEY DEPARTMENT USE ONLY:  Additional Organs Registered? none    Maximum Acceptable Number of HLA Mismatches  ABDR:     6      (0-6)               AB:               (0-4)  ADR:   _____  (0-4)              BDR: _____ (0-4)  A:        _____  (0-2)              B:      _____ (0-2)          DR: ______ (0-2)    Will Recipient Accept?   Accept HBcAB Positive Organ:            Yes  Accept HBV LIBIA Positive Organ:        No  Accept HCV Antibody Positive Organ: No   Accept HCV LIBIA Positive Organ: No    Dual Kidney and En Bloc Opt In : No  Dual  Local:   No  Dual Import:   No  En Bloc Local:   No  En Bloc Import: No     Accept KDPI > 85: Single: No     Local: No     Import: No  Accept KDPI > 85: Dual: No     Local: No     Import: No    ### NURSE TO VERIFY CONSENT AND MAKE ANY NECESSARY CHANGES NEEDED IN " UNET AT THE TIME OF VERIFICATION ###    Unacceptible Antigens  If yes, list     Lab Results   Component Value Date    AC1QBWJ NEGATIVE 08/09/2023    CIIAB DR53 08/09/2023       ### DO NOT LIST IF ANTIGEN VALUE WEAK ###    eGFR Wait Time Modification    Based on Race Black or  does patient qualify for lab review? Yes      If yes, were qualifying SPAN 20 labs found? No      If found, generate eGFR Wait Time Modification Form and scan into Media.   Send patient letter when wait time is granted.     Hep B Vaccine series completed: yes    Blood Type x2 was verified by myself and Zackery Mathis.  Blood type determination and reporting was completed according to the programs protocols and OPTN requirements.

## 2024-12-27 ENCOUNTER — HOSPITAL ENCOUNTER (OUTPATIENT)
Dept: RADIOLOGY | Facility: HOSPITAL | Age: 54
Discharge: HOME | End: 2024-12-27
Attending: NURSE PRACTITIONER
Payer: MEDICARE

## 2024-12-27 ENCOUNTER — TELEPHONE (OUTPATIENT)
Dept: TRANSPLANT | Facility: CLINIC | Age: 54
End: 2024-12-27
Payer: MEDICARE

## 2024-12-27 ENCOUNTER — OFFICE VISIT (OUTPATIENT)
Dept: TRANSPLANT | Facility: CLINIC | Age: 54
End: 2024-12-27
Payer: MEDICARE

## 2024-12-27 ENCOUNTER — HOSPITAL ENCOUNTER (OUTPATIENT)
Dept: CARDIOLOGY | Facility: HOSPITAL | Age: 54
Discharge: HOME | End: 2024-12-27
Attending: NURSE PRACTITIONER
Payer: MEDICARE

## 2024-12-27 VITALS
WEIGHT: 229.75 LBS | SYSTOLIC BLOOD PRESSURE: 136 MMHG | HEIGHT: 65 IN | BODY MASS INDEX: 38.28 KG/M2 | RESPIRATION RATE: 18 BRPM | TEMPERATURE: 98 F | HEART RATE: 74 BPM | OXYGEN SATURATION: 100 % | DIASTOLIC BLOOD PRESSURE: 75 MMHG

## 2024-12-27 VITALS
DIASTOLIC BLOOD PRESSURE: 70 MMHG | HEIGHT: 65 IN | SYSTOLIC BLOOD PRESSURE: 120 MMHG | WEIGHT: 229 LBS | BODY MASS INDEX: 38.15 KG/M2 | HEART RATE: 65 BPM

## 2024-12-27 DIAGNOSIS — Z76.82 ORGAN TRANSPLANT CANDIDATE: ICD-10-CM

## 2024-12-27 DIAGNOSIS — Z76.82 PATIENT ON WAITING LIST FOR KIDNEY TRANSPLANT: Primary | ICD-10-CM

## 2024-12-27 DIAGNOSIS — N18.6 ESRD ON DIALYSIS: ICD-10-CM

## 2024-12-27 DIAGNOSIS — I10 HYPERTENSION, UNSPECIFIED TYPE: ICD-10-CM

## 2024-12-27 DIAGNOSIS — Z99.2 ESRD ON DIALYSIS: ICD-10-CM

## 2024-12-27 LAB
ASCENDING AORTA: 3.16 CM
AV AREA BY CONTINUOUS VTI: 2.5 CM2
AV INDEX (PROSTH): 0.86
AV LVOT MEAN GRADIENT: 2 MMHG
AV LVOT PEAK GRADIENT: 4 MMHG
AV MEAN GRADIENT: 3.5 MMHG
AV PEAK GRADIENT: 6.8 MMHG
AV VALVE AREA BY VELOCITY RATIO: 2.2 CM²
AV VALVE AREA: 2.4 CM2
AV VELOCITY RATIO: 0.77
BSA FOR ECHO PROCEDURE: 2.18 M2
CV ECHO LV RWT: 0.38 CM
DOP CALC AO PEAK VEL: 1.3 M/S
DOP CALC AO VTI: 31.4 CM
DOP CALC LVOT AREA: 2.8 CM2
DOP CALC LVOT DIAMETER: 1.9 CM
DOP CALC LVOT PEAK VEL: 1 M/S
DOP CALC LVOT STROKE VOLUME: 76.5 CM3
DOP CALCLVOT PEAK VEL VTI: 27 CM
E WAVE DECELERATION TIME: 187.74 MS
E/A RATIO: 1.06
E/E' RATIO: 13.67 M/S
ECHO EF ESTIMATED: 55 %
ECHO LV POSTERIOR WALL: 1 CM (ref 0.6–1.1)
EJECTION FRACTION: 55 %
FRACTIONAL SHORTENING: 28.8 % (ref 28–44)
INTERVENTRICULAR SEPTUM: 0.8 CM (ref 0.6–1.1)
LA MAJOR: 4.83 CM
LA MINOR: 4.46 CM
LA WIDTH: 3.46 CM
LEFT ATRIUM SIZE: 3.18 CM
LEFT ATRIUM VOLUME INDEX MOD: 16 ML/M2
LEFT ATRIUM VOLUME INDEX: 20.7 ML/M2
LEFT ATRIUM VOLUME MOD: 33.51 ML
LEFT ATRIUM VOLUME: 43.37 CM3
LEFT INTERNAL DIMENSION IN SYSTOLE: 3.7 CM (ref 2.1–4)
LEFT VENTRICLE DIASTOLIC VOLUME INDEX: 62.53 ML/M2
LEFT VENTRICLE DIASTOLIC VOLUME: 131.32 ML
LEFT VENTRICLE MASS INDEX: 80.5 G/M2
LEFT VENTRICLE SYSTOLIC VOLUME INDEX: 28.2 ML/M2
LEFT VENTRICLE SYSTOLIC VOLUME: 59.27 ML
LEFT VENTRICULAR INTERNAL DIMENSION IN DIASTOLE: 5.2 CM (ref 3.5–6)
LEFT VENTRICULAR MASS: 169 G
LV LATERAL E/E' RATIO: 11.71
LV SEPTAL E/E' RATIO: 16.4
MV PEAK A VEL: 0.77 M/S
MV PEAK E VEL: 0.82 M/S
OHS CV RV/LV RATIO: 0.58 CM
RA MAJOR: 4.15 CM
RA PRESSURE ESTIMATED: 3 MMHG
RA WIDTH: 2.92 CM
RIGHT ATRIAL AREA: 10.7 CM2
RIGHT VENTRICLE DIASTOLIC BASEL DIMENSION: 3 CM
RV TISSUE DOPPLER FREE WALL SYSTOLIC VELOCITY 1 (APICAL 4 CHAMBER VIEW): 10.61 CM/S
SINUS: 3.39 CM
STJ: 3.16 CM
TDI LATERAL: 0.07 M/S
TDI SEPTAL: 0.05 M/S
TDI: 0.06 M/S
TRICUSPID ANNULAR PLANE SYSTOLIC EXCURSION: 1.78 CM
Z-SCORE OF LEFT VENTRICULAR DIMENSION IN END DIASTOLE: -2.28
Z-SCORE OF LEFT VENTRICULAR DIMENSION IN END SYSTOLE: -0.59

## 2024-12-27 PROCEDURE — 93306 TTE W/DOPPLER COMPLETE: CPT | Mod: TXP

## 2024-12-27 PROCEDURE — 99999 PR PBB SHADOW E&M-EST. PATIENT-LVL IV: CPT | Mod: PBBFAC,TXP,, | Performed by: NURSE PRACTITIONER

## 2024-12-27 PROCEDURE — 93306 TTE W/DOPPLER COMPLETE: CPT | Mod: 26,TXP,, | Performed by: INTERNAL MEDICINE

## 2024-12-27 PROCEDURE — 99215 OFFICE O/P EST HI 40 MIN: CPT | Mod: S$PBB,TXP,, | Performed by: NURSE PRACTITIONER

## 2024-12-27 PROCEDURE — 76770 US EXAM ABDO BACK WALL COMP: CPT | Mod: 26,TXP,, | Performed by: INTERNAL MEDICINE

## 2024-12-27 PROCEDURE — 76770 US EXAM ABDO BACK WALL COMP: CPT | Mod: TC,TXP

## 2024-12-27 PROCEDURE — 99214 OFFICE O/P EST MOD 30 MIN: CPT | Mod: PBBFAC,25,TXP | Performed by: NURSE PRACTITIONER

## 2024-12-27 RX ORDER — DOXYCYCLINE 100 MG/1
100 CAPSULE ORAL
COMMUNITY
Start: 2024-12-20

## 2024-12-27 RX ORDER — LANTHANUM CARBONATE 1000 MG/1
1000 TABLET, CHEWABLE ORAL 3 TIMES DAILY
COMMUNITY

## 2024-12-27 NOTE — PROGRESS NOTES
Transplant Recipient Adult Psychosocial Assessment    [facility] [address]  [place] [address]  Telephone Information:   Mobile [phone]   Home  [phone] (home)  Work  There is no work phone number on file.  E-mail  [email or web address]    Sex: female  YOB: 1970  Age: 54 y.o.    Encounter Date: 2024  [place] Citizen: yes  Primary Language: English   Needed: no    Emergency Contact:  Name: Roberth Oneal   Relationship: daughter  Address: [place]  Phone Numbers:   [phone] (mobile)    Family/Social Support:   Number of dependents/: 0  Marital history: Single  Other family dynamics: The patient reported that her mother Oralia Garcias is living and willing to assist if needed post transplant. The patient's father is . The patient has one child a supportive daughter Roberth Oneal that will be her primary caregiver.      Household Composition:  Currently, the patient lives alone.         Do you and your caregivers have access to reliable transportation? yes  PRIMARY CAREGIVER: Roberth Oneal (28)  daughter will be primary caregiver, phone number [phone].      provided in-depth information to patient and caregiver regarding pre- and post-transplant caregiver role.   strongly encourages patient and caregiver to have concrete plan regarding post-transplant care giving, including back-up caregiver(s) to ensure care giving needs are met as needed.    Patient and Caregiver states understanding all aspects of caregiver role/commitment and is able/willing/committed to being caregiver to the fullest extent necessary.    Patient and Caregiver verbalizes understanding of the education provided today and caregiver responsibilities.         remains available. Patient and Caregiver agree to contact  in a timely manner if concerns arise.      Able to take time off work without financial concerns: yes.  The patient's caregiver reported that she works as a nurse and will be able to take off without financial concerns to care for her mother during her recovery.     Additional Significant Others who will Assist with Transplant:  Name: Dimitri Love   Age: 40  City: [place] State: [place]  Relationship: friend  Does person drive? yes    Name:  Oralia Miranda   Age: 77  City: [facility] State: [place]  Relationship: mother  Does person drive? yes    Living Will: no  Healthcare Power of : no  Advance Directives on file: <<no information> per medical record.  Verbally reviewed LW/HCPA information.   provided patient with copy of LW/HCPA documents and provided education on completion of forms.    Living Donors: No.    Highest Education Level: Attended College/Technical School  Reading Ability: college  Reports difficulty with: seeing, The patient wears glasses.    Learns Best By: Multisensory      Status: no  VA Benefits: no     Working for Income: yes  If yes, working activity level: Working Full Time  Patient is employed full time by the [employer]. The patient reports self- employment as a .     Spouse/Significant Other Employment: None     Disabled: no    Monthly Income:  Salary/Wages: $1300  Able to afford all costs now and if transplanted, including medications: yes  Patient and Caregiver verbalizes understanding of personal responsibilities related to transplant costs and the importance of having a financial plan to ensure that patients transplant costs are fully covered.      provided fundraising information/education.  Patient and Caregiver verbalizes understanding.   remains available.    Insurance:   Payer/Plan Subscr  Sex Relation Sub. Ins. ID Effective Group Num   1. MEDICARE - [place]* TARA,1970 Female Self [id number]                                    [address]   2. BLUE CROSS OF* TARA,1970 Female Self  [id number]* 8/1/16 [id number]                                   [address] [id number] [id number]     Primary Insurance (for UNOS reporting): Medicare   Secondary Insurance (for UNOS reporting): Public Insurance - Medicare FFS (Fee For Service)  Patient and Caregiver verbalizes clear understanding that patient may experience difficulty obtaining and/or be denied insurance coverage post-surgery. This includes and is not limited to disability insurance, life insurance, health insurance, burial insurance, long term care insurance, and other insurances.    Patient and Caregiver also reports understanding that future health concerns related to or unrelated to transplantation may not be covered by patient's insurance.  Resources and information provided and reviewed.      Patient and Caregiver provides verbal permission to release any necessary information to outside resources for patient care and discharge planning.  Resources and information provided are reviewed.      Dialysis Adherence:  Patient reports adherence to her  PD Dialysis regiment treatment schedule on a daily basis.    Infusion Service: patient utilizing? no  Home Health: patient utilizing? no  DME: yes PD machine and supplies; CPAP   Pulmonary/Cardiac Rehab: no   ADLS:  The patient is independent with her activities of daily living.  The patient drives and own a car.     Adherence:   The patient reports adherence to her medical appointments as scheduled.  Adherence education and counseling provided.     Per History Section:  Past Medical History:   Diagnosis Date    Anemia     Disorder of kidney and ureter 2021    Had CKD for a while and developed ESRD  on HD after developing COVID infection    Gout, unspecified     Gout, unspecified 2007    Hypertension 2018    OA (osteoarthritis) 2020    ankles    VELIA on CPAP      Social History     Tobacco Use    Smoking status: Never    Smokeless tobacco: Never   Substance Use Topics    Alcohol use: Not Currently      Social History     Substance and Sexual Activity   Drug Use Never     Social History     Substance and Sexual Activity   Sexual Activity Not Currently       Per Today's Psychosocial:  Tobacco: none, patient denies any use.  Alcohol: none, patient denies any use.  Illicit Drugs/Non-prescribed Medications: none, patient denies any use.    Patient and Caregiver states clear understanding of the potential impact of substance use as it relates to transplant candidacy and is aware of possible random substance screening.  Substance abstinence/cessation counseling, education and resources provided and reviewed.     Arrests/DWI/Treatment/Rehab: patient denies    Psychiatric History:    Mental Health:  The patient denies any current or past mental health history.   Psychiatrist/Counselor: The patient denies current or past psychiatrist or counselor services.   Medications:  The patient denies any psychotropic medications currently or in the past.   Suicide/Homicide Issues: The patient denies current or past suicide or homicidal ideations.    Safety at home: The patient reported that she feels safe at home.     Knowledge: Patient and Caregiver states having clear understanding and realistic expectations regarding the potential risks and potential benefits of organ transplantation and organ donation, agrees to discuss with health care team members and support system members, and to utilize available resources and express questions and/or concerns in order to further facilitate the pt informed decision-making.  Resources and information provided and reviewed.     Patient and Caregiver is aware of [facility]'s affiliation and/or partnership with agencies in home health care, LTAC, SNF, [facility], and other hospitals and clinics.    Understanding: Patient and Caregiver reports having a clear understanding of the many lifetime commitments involved with being a transplant recipient, including costs, compliance, medications, lab work,  procedures, appointments, concrete and financial planning, preparedness, timely and appropriate communication of concerns, abstinence (ETOH, tobacco, illicit non-prescribed drugs), adherence to all health care team recommendations, support system and caregiver involvement, appropriate and timely resource utilization and follow-through, mental health counseling as needed/recommended, and patient and caregiver responsibilities.  Social Service Handbook, resources and detailed educational information provided and reviewed.  Educational information provided.    Patient and Caregiver also reports current and expected compliance with health care regime and states having a clear understanding of the importance of compliance.      Patient and Caregiver reports a clear understanding that risks and benefits may be involved with organ transplantation and with organ donation.      Patient and Caregiver also reports clear understanding that psychosocial risk factors may affect patient, and include but are not limited to feelings of depression, generalized anxiety, anxiety regarding dependence on others, post traumatic stress disorder, feelings of guilt and other emotional and/or mental concerns, and/or exacerbation of existing mental health concerns.  Detailed resources provided and discussed.     Patient and Caregiver agrees to access appropriate resources in a timely manner as needed and/or as recommended, and to communicate concerns appropriately.  Patient and Caregiver also reports a clear understanding of treatment options available.      reviewed education, provided additional information, and answered questions.    Feelings or Concerns: The patient reported that she is motivated to obtain a Kidney transplant.    Coping: Identify Patient & Caregiver Strategies to [place]:   1. In the past - Staying active, watching tv , family and friend support    2. Currently & Pre-transplant - staying active, family and friend  "support    3. At the time of surgery - family support, watching tv   4. During post-Transplant & Recovery Period - family support, watching tv     Goals: The patient reported her goal is " I want my life back ". The patient reported that she wants to be independent.   Patient referred to Vocational Rehabilitation.    Interview Behavior: Patient and Caregiver presents as alert and oriented x 4, pleasant, good eye contact, well groomed, concentration/judgement good, calm, communicative, cooperative, and asking and answering questions appropriately.  The patient along with her caregiver Roberth Oneal met with KP Pelaez shadowed by Sin Howell.          Transplant Social Work - Candidacy  Assessment/Plan:     Psychosocial Suitability: Patient presents as a suitable candidate for kidney transplant at this time. Based on psychosocial risk factors, patient presents as low risk, due to adequate caregiver plan . The patient has adequate insurance coverage through Medicare and Blue Cross at this time. The patient is highly motivated to receive a organ donation. The patient has been adhering to her PD Dialysis schedule on a consistent basis. The patient does not have any tobacco, alcohol, or illicit substance use at this time. The patient reports no current or past mental health history.      Recommendations/Additional Comments: This SW recommends that the patient contact her insurance provider to inquire about lodging assistance coverage post transplant.  SW recommends that this patient conduct fundraising to assist patient with pay for cost of medications, food, gas, and other transplant related needs. SW recommends that patient remain aware of potential mental health concerns and contact the team if any concerns arise. SW recommends that patient remain abstinent from tobacco, ETOH, and drug use. SW supports the patient continued dialysis adherence. SW remains available to answer any questions or concerns that " arise as the patient moves through the transplant process.     Final determination of transplant candidacy will be reviewed and determined by the selection committee    Cooper Pelaez LCSW

## 2024-12-27 NOTE — PROGRESS NOTES
AA YEARLY PATIENT EDUCATION NOTE    Ms. Annia Abarca was seen in pre-kidney transplant clinic for yearly (or six months) evaluation for kidney, kidney/pancreas or pancreas only transplant.  The patient attended a web based education session that discussed/reviewed the following aspects of transplantation: UNOS waitlist management/multiple listings, types of organs offered (KDPI < 85%, KDPI > 85%, PHS increased risk, DCD, HCV+), financial aspects, surgical procedures, dietary instruction pre- and post-transplant, health maintenance pre- and post-transplant, post-transplant hospitalization and outpatient follow-up, potential to participate in a research protocol, and medication management and side effects. A question and answer session was provided after the presentation.    The patient was seen by all members of the multi-disciplinary team to include: Nephrologist/PA, , , Pharmacist and Dietician (if applicable).    The Patient was educated on OPTN policy change regarding race based eGFR. For Black or  Americans, this eGFR could have shown that their kidneys were working better than they were.    Because of this change, we are looking at everyones record and assessing waiting time for people who are eligible. We will be reviewing your medical records and will notify you if you are eligible. We also encouraged patient to provide span 20 labs that are not in our electronic medical records.    The patient reviewed and signed all consents for evaluation which were witnessed and sent to scanning into the [facility] chart.    The patient was given an education book and plan for further evaluation based on her individual assessment.      The patient was encouraged to call with any questions or concerns.

## 2024-12-27 NOTE — PROGRESS NOTES
Kidney Transplant Recipient Reevalulation    Referring Physician: Douglas Bhatt  Current Nephrologist: Douglas Bhatt  Waitlist Status: active  Dialysis Start Date: 2021    Subjective:     CC:  Annual reassessment of kidney transplant candidacy.    HPI:  Ms. Abarca is a 54 y.o. year old Black or  female with ESRD secondary to HTN.  She has been on the wait list for a kidney transplant at [facility] since 2021. Patient is currently on peritoneal dialysis started on . Patient is dialyzing on CAPD.  Patient reports that she is tolerating dialysis well. . She has a PD catheter. Patient denies any recent hospitalizations or ED visits.    Hospitalizations/ED visits:      Interval History:   Being worked up at [facility]. Had testing recently there including stress test. Will only need Renal US today    CXR: 11/15/24 clear  CT: 11/15/24--pending upload  Renal US: pending  Echo:  Results for orders placed during the hospital encounter of 24    Echo    Interpretation Summary    Left Ventricle: The left ventricle is normal in size. Ventricular mass is normal. Normal wall thickness. Normal wall motion. There is normal systolic function with a visually estimated ejection fraction of 55 - 60%. Ejection fraction is approximately 55%. There is normal diastolic function.    Right Ventricle: Normal right ventricular cavity size. Wall thickness is normal. Systolic function is normal.    IVC/SVC: Normal venous pressure at 3 mmHg.      Stress:  Outside SPECT 24 normal       Colonoscopy: 10/3/23 tubular adenoma  PTH:  Lab Results   Component Value Date    .5 (H) 2024    CALCIUM 9.4 2023    PHOS 6.6 (H) 2023     PAP: hysterectomy  MM24 benign  GYN follow-up:      Current Outpatient Medications:     allopurinoL (ZYLOPRIM) 300 MG tablet, Take 300 mg by mouth once daily., Disp: , Rfl:     carvediloL (COREG) 25 MG tablet, Take 25 mg by mouth 2 (two) times daily., Disp: ,  Rfl:     cinacalcet (SENSIPAR) 30 MG Tab, Take 30 mg by mouth daily with breakfast., Disp: , Rfl:     colchicine (COLCRYS) 0.6 mg tablet, Take 0.6 mg by mouth daily as needed., Disp: , Rfl:     doxycycline (VIBRAMYCIN) 100 MG Cap, Take 100 mg by mouth., Disp: , Rfl:     estradioL (ESTRACE) 0.5 MG tablet, Take 0.5 mg by mouth once daily., Disp: , Rfl:     furosemide (LASIX) 80 MG tablet, Take 80 mg by mouth 2 (two) times daily., Disp: , Rfl:     gabapentin (NEURONTIN) 100 MG capsule, Take 100 mg by mouth 2 (two) times daily., Disp: , Rfl:     lanthanum (FOSRENOL) 1000 MG chewable tablet, Take 1,000 mg by mouth 3 (three) times daily., Disp: , Rfl:     metOLazone (ZAROXOLYN) 5 MG tablet, Take 5 mg by mouth once daily., Disp: , Rfl:     potassium chloride SA (K-DUR,KLOR-CON) 20 MEQ tablet, Take 20 mEq by mouth once daily., Disp: , Rfl:     vit B complx C/folic acid/zinc (RENAPLEX ORAL), Take by mouth., Disp: , Rfl:     fenofibrate 160 MG Tab, Take 160 mg by mouth once daily. (Patient not taking: Reported on 2024), Disp: , Rfl:       Past Medical History:   Diagnosis Date    Anemia     Disorder of kidney and ureter     Had CKD for a while and developed ESRD  on HD after developing COVID infection    Gout, unspecified     Gout, unspecified 2007    Hypertension 2018    OA (osteoarthritis) 2020    ankles    VELIA on CPAP        Past Surgical History:   Procedure Laterality Date    BREAST SURGERY      Breast reduction     SECTION      PD catheter      PERITONEAL CATHETER INSERTION             Review of Systems   Constitutional:  Positive for fatigue. Negative for appetite change, chills and fever.   HENT:  Negative for trouble swallowing.    Respiratory:  Negative for cough, chest tightness, shortness of breath and wheezing.    Cardiovascular:  Negative for chest pain, palpitations and leg swelling.   Gastrointestinal:  Negative for abdominal pain, constipation, diarrhea and nausea.   Genitourinary:   "Positive for decreased urine volume.   Musculoskeletal:  Negative for arthralgias and myalgias.   Skin:  Negative for rash.   Neurological:  Positive for weakness. Negative for dizziness, light-headedness and headaches.   Psychiatric/Behavioral:  Negative for sleep disturbance.        Objective:   /75 (BP Location: Right arm, Patient Position: Sitting)   Pulse 74   Temp 97.7 °F (36.5 °C) (Temporal)   Resp 18   Ht 5' 5" (1.651 m)   Wt 104.2 kg (229 lb 11.5 oz)   SpO2 100%   BMI 38.23 kg/m²       Physical Exam  Constitutional:       General: She is not in acute distress.     Appearance: She is well-developed. She is obese. She is not diaphoretic.   Cardiovascular:      Rate and Rhythm: Normal rate and regular rhythm.      Heart sounds: Normal heart sounds.   Pulmonary:      Effort: Pulmonary effort is normal.      Breath sounds: Normal breath sounds.   Abdominal:      General: Bowel sounds are normal.      Palpations: Abdomen is soft.   Musculoskeletal:         General: No tenderness. Normal range of motion.   Skin:     General: Skin is warm and dry.      Findings: No rash.      Nails: There is no clubbing.   Neurological:      Mental Status: She is alert and oriented to person, place, and time.   Psychiatric:         Behavior: Behavior normal.         Labs:  Lab Results   Component Value Date    WBC 4.98 08/09/2023    HGB 11.2 (L) 11/13/2024    HCT 30.3 (L) 08/09/2023     08/09/2023    K 3.7 08/09/2023     08/09/2023    CO2 21 (L) 08/09/2023    BUN 76 (H) 08/09/2023    CREATININE 12.2 (H) 08/09/2023    EGFRNORACEVR 3.3 (A) 08/09/2023    CALCIUM 9.4 08/09/2023    PHOS 6.6 (H) 08/09/2023    ALBUMIN 3.7 08/09/2023    AST 59 (H) 08/09/2023    ALT 56 (H) 08/09/2023    .5 (H) 12/27/2024       No results found for: "PREALBUMIN", "BILIRUBINUA", "GGT", "AMYLASE", "LIPASE", "PROTEINUA", "NITRITE", "RBCUA", "WBCUA"    No results found for: "HLAABCTYPE"    Lab Results   Component Value Date    " CPRA 50 08/09/2023    XO3TZMR NEGATIVE 08/09/2023    CIIAB DR53 08/09/2023       Labs were reviewed with the patient.    Pre-transplant Workup:   Reviewed with the patient.    Assessment:     1. Patient on waiting list for kidney transplant    2. ESRD on dialysis    3. Hypertension, unspecified type        Plan:   Being worked up at [facility]. Had testing recently there including stress test. Will only need Renal US today    Transplant Candidacy:   Ms. Abarca is a suitable kidney transplant candidate.  Meets center eligibility for accepting HCV+ donor offer - Yes.  Patient educated on HCV+ donors. April is willing  to accept HCV+ donor offer -  Yes   Patient is a candidate for KDPI > 85 kidney donor offer - No.  She remains in overall stable health, and will remain active on the transplant list.    Patient advised that it is recommended that all transplant candidates, and their close contacts and household members receive Covid vaccination.    Brandi Montague NP       Follow-up:   In addition to the tests noted in the plan, Ms. Abarca will continue to have reevaluation as per the standing pre-kidney transplant protocol:  Monthly blood for PRA  Annual return to clinic, except HIV positive, > 65 years of age, or pancreas transplant candidates who will be scheduled to see transplant every 6 months while in pre-transplant phase  Annual re-testing: CXR, EKG, yearly mammograms for women over 40 and PSA for males over 40, cardiology follow-up as recommended by initial cardiology pre-transplant evaluation  Renal ultrasound every 2 years  Baseline colonoscopy after age 50 and repeated as recommended    UNOS Patient Status  Functional Status: 60% - Requires occasional assistance but is able to care for needs  Physical Capacity: No Limitations

## 2024-12-27 NOTE — LETTER
December 30, 2024        Douglas Bhatt  [address] [place] [address]  Phone: [phone]  Fax: [phone]             [facility]  [address]  [facility] [address]  Phone: [phone]   Patient: Annia Abarca   MR Number: [id number]   YOB: 1970   Date of Visit: 12/27/2024       Dear Dr. Douglas Bhatt    Thank you for referring Annia Abarca to me for evaluation. Attached you will find relevant portions of my assessment and plan of care.    If you have questions, please do not hesitate to call me. I look forward to following Annia Abarca along with you.    Sincerely,    Brandi Montague, NP    Enclosure    If you would like to receive this communication electronically, please contact [email or web address] or [phone] to request [employer] access.    [employer] is a tool which provides read-only access to select patient information with whom you have a relationship. Its easy to use and provides real time access to review your patients record including encounter summaries, notes, results, and demographic information.    If you feel you have received this communication in error or would no longer like to receive these types of communications, please e-mail [email or web address]

## 2024-12-30 ENCOUNTER — TELEPHONE (OUTPATIENT)
Dept: TRANSPLANT | Facility: CLINIC | Age: 54
End: 2024-12-30
Payer: MEDICARE

## 2024-12-30 NOTE — TELEPHONE ENCOUNTER
MA notes per Adherence form     PD PT    FOR THE PAST THREE MONTHS:    0-AMA's  0-No-shows    No concerns with care giving, transportation, or mental health    Per adherence form daughter or pts mother will be caregiver.  Pt has own car and no concerns with mental health.     Scanned in pt's media    Donna Campbell  Abdominal Transplant MA

## 2025-03-03 ENCOUNTER — TELEPHONE (OUTPATIENT)
Dept: TRANSPLANT | Facility: CLINIC | Age: 55
End: 2025-03-03
Payer: MEDICARE